# Patient Record
Sex: FEMALE | Race: WHITE
[De-identification: names, ages, dates, MRNs, and addresses within clinical notes are randomized per-mention and may not be internally consistent; named-entity substitution may affect disease eponyms.]

---

## 2018-01-24 ENCOUNTER — HOSPITAL ENCOUNTER (EMERGENCY)
Dept: HOSPITAL 62 - ER | Age: 33
Discharge: HOME | End: 2018-01-24
Payer: COMMERCIAL

## 2018-01-24 VITALS — SYSTOLIC BLOOD PRESSURE: 123 MMHG | DIASTOLIC BLOOD PRESSURE: 69 MMHG

## 2018-01-24 DIAGNOSIS — R03.0: ICD-10-CM

## 2018-01-24 DIAGNOSIS — Z79.1: ICD-10-CM

## 2018-01-24 DIAGNOSIS — Z88.0: ICD-10-CM

## 2018-01-24 DIAGNOSIS — Z79.899: ICD-10-CM

## 2018-01-24 DIAGNOSIS — M54.2: ICD-10-CM

## 2018-01-24 DIAGNOSIS — M62.830: ICD-10-CM

## 2018-01-24 DIAGNOSIS — M47.9: ICD-10-CM

## 2018-01-24 DIAGNOSIS — M43.6: Primary | ICD-10-CM

## 2018-01-24 PROCEDURE — 99283 EMERGENCY DEPT VISIT LOW MDM: CPT

## 2018-01-24 PROCEDURE — 96372 THER/PROPH/DIAG INJ SC/IM: CPT

## 2018-01-24 NOTE — ER DOCUMENT REPORT
ED Neck/Back Problem





- General


Chief Complaint: Neck Pain < 24hrs old


Stated Complaint: NECK PAIN


Time Seen by Provider: 01/24/18 16:18


Mode of Arrival: Ambulatory


Information source: Patient


TRAVEL OUTSIDE OF THE [place] IN LAST 30 DAYS: No





- HPI


Patient complains to provider of: Pain


Onset: This morning


Where: Home


Onset: Cannot confirm


Timing: Constant


Quality of pain: No pain, Cramping


Severity: Moderate


Recent injury: No


Notes: 





Patient arrives with complaints of neck pain and stiffness with stiffness to 

the right lateral neck that started this morning when she woke up.  Patient has 

a history of arthritis in her neck and states that she currently takes Celebrex 

and Robaxin for her neck.  She denies any traumas or falls.  She denies any 

fever.  She denies any headache.  She denies any unilateral numbness, tingling, 

weakness.  She denies any chest pain or shortness of breath.  She denies 

fevers.  She denies blood thinners.  She denies IV drug use.  She denies any 

immunosuppression or diabetes.  No chest pain or shortness of breath.  No bowel 

or bladder dysfunction.  She denies any abdominal pain.  No nausea, vomiting, 

diarrhea.  No blurred or loss vision.  Pain is significantly worse with any 

sort of movement, better with keeping her head still.  No rash.  No other 

complaints at this time.





- Related Data


Allergies/Adverse Reactions: 


 





Penicillins Allergy (Severe, Verified 01/24/18 13:36)


 Generalized edema











Past Medical History





- Social History


Smoking Status: Unknown if Ever Smoked


Family History: Reviewed & Not Pertinent





- Past Medical History


Cardiac Medical History: 


   Denies: Hx Heart Attack, Hx Hypertension


Pulmonary Medical History: Reports: Hx Asthma - as a child/no problem as an 

adult


Neurological Medical History: Denies: Hx Cerebrovascular Accident, Hx Seizures


GI Medical History: Denies: Hx Hepatitis, Hx Hiatal Hernia -  small umbilical 

hernia, Hx Ulcer


Infectious Medical History: Denies: Hx Hepatitis


Past Surgical History: Reports: Hx Gynecologic Surgery.  Denies: Hx Hysterectomy

, Hx Mastectomy, Hx Open Heart Surgery, Hx Pacemaker





- Immunizations


Immunizations up to date: Yes


Hx Diphtheria, Pertussis, Tetanus Vaccination: Yes





Review of Systems





- Review of Systems


-: Yes All other systems reviewed and negative





Physical Exam





- Notes


Notes: 





GENERAL: alert, cooperative, nontoxic, no distress.


HEAD: normocephalic, atraumatic


EYES: conjunctiva pink without discharge, no external redness or swelling. 

Pupils are equal, round, reactive to light.


EARS: no external swelling, no external redness


NOSE: atraumatic, no external swelling


MOUTH/THROAT: mucous membranes moist and pink, posterior pharynx without 

erythema, swelling, exudate. No trismus or drooling.


NECK: soft, supple, no meningismus.  Muscle spasm noted to the right trapezius 

muscle with tenderness along the trapezius muscle and limited range of motion 

of the neck secondary to pain and muscle spasm.  No stridor.  No redness.


CHEST: no distress, lungs clear and equal throughout.  No wheezing, rales, 

rhonchi.


CARDIAC: regular rate and rhythm, no murmur, normal capillary refill, normal 

pulses.  No peripheral edema noted.


BACK: full range of motion, no CVA tenderness.


EXTREMITIES: full range of motion of all extremities.  No redness, no swelling.


NEURO: alert and oriented x 3, cranial nerves II through XII are grossly 

intact.  Upper and lower extremities are equal throughout.  Normal sensation. 

No focal deficits, full range of motion of all extremities. normal finger to 

nose.  Bicep and tricep reflexes are +2 bilaterally.


PYSCH: appropriate mood, affect.  Patient is cooperative.


SKIN: pink, warm, dry, no rash.





Course





- Re-evaluation


Re-evalutation: 





01/24/18 16:36


Patient is nontoxic appearing with stable vitals.  The patient has a history of 

arthritis within her neck.  She woke up this morning with stiffness and 

increased pain to the neck.  She is noted to have muscle spasms to the right 

trapezius muscle and exam consistent with torticollis.  No fevers, no 

meningismus, no sign of meningitis.  She has a nonfocal neurological exam with 

normal sensation, strength and reflexes.  No sign of epidural abscess/bleed.  

Patient will be given a shot of Toradol and Valium here in the emergency 

department.  She will be discharged home with prescription for Voltaren and 

Valium.  She has an appointment with her primary care doctor tomorrow which she 

was instructed to keep for reevaluation.  She should follow-up sooner she 

develops increasing pain, fevers, weakness, any further concerns.





The patient's emergency department workup and current diagnosis were explained 

to the patient and or family.  Follow-up instructions were provided.  

Medications if prescribed were discussed. Instructions for when to return to 

the emergency department including specific  worrisome symptoms were discussed 

with the patient and/or family.





The patient is noted to have elevated blood pressure during today's emergency 

department visit.  The patient was informed of this finding.  The patient was 

instructed that this may be related to pre-hypertension and requires further 

evaluation with a primary care provider.  The patient has no hypertensive 

symptoms at this time.





Discharge





- Discharge


Clinical Impression: 


 Torticollis, acute





Condition: Stable


Disposition: HOME, SELF-CARE


Instructions:  Torticollis ([facility])


Additional Instructions: 


Take medications as prescribed.  Follow-up with your doctor as scheduled 

tomorrow.  Follow-up sooner for increasing pain, fever, severe headache, 

numbness, tingling, weakness, any further concerns.





Your blood pressure was elevated during today's visit.  Have this rechecked 

with your doctor.





The medication you were prescribed today may cause drowsiness.  Do not drive or 

operate heavy machinery while taking this medication.


Prescriptions: 


Diazepam [Valium 5 mg Tablet] 5 mg PO QIDP PRN #15 tablet


 PRN Reason: 


Diclofenac Sodium [Voltaren 50 Mg Tablet.Dr] 50 mg PO BID #20 tablet.dr


Forms:  Elevated Blood Pressure, Smoking Cessation Education


Referrals: 


SAUL LINARES MD [NO LOCAL MD] - Follow up as needed


FELISA TURNER MD [NO LOCAL MD] - Follow up as needed


MARLEY BARAKAT MD [NO LOCAL MD] - Follow up as needed


JAMISON GANDHI MD [COMMUNITY BASED STAFF] - Follow up as needed

## 2019-03-13 ENCOUNTER — HOSPITAL ENCOUNTER (EMERGENCY)
Dept: HOSPITAL 62 - ER | Age: 34
Discharge: HOME | End: 2019-03-13
Payer: COMMERCIAL

## 2019-03-13 VITALS — SYSTOLIC BLOOD PRESSURE: 135 MMHG | DIASTOLIC BLOOD PRESSURE: 74 MMHG
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
## 2019-03-13 DIAGNOSIS — S92.412A: Primary | ICD-10-CM

## 2019-03-13 DIAGNOSIS — F17.200: ICD-10-CM

## 2019-03-13 PROCEDURE — 99283 EMERGENCY DEPT VISIT LOW MDM: CPT

## 2019-03-13 NOTE — ER DOCUMENT REPORT
ED Medical Screen (RME)





- General


Chief Complaint: Toe Injury


Stated Complaint: LEFT GREAT TOE INJURY


Time Seen by Provider: 03/13/19 22:46


Mode of Arrival: Wheelchair


Information source: Patient


Notes: 





pt tripped today hurting right foot. hurts to walk, swellling noted











I have greeted and performed a rapid initial assessment of this patient.  A 

comprehensive ED assessment and evaluation of the patient, analysis of test 

results and completion of the medical decision making process will be conducted 

by additional ED providers.


TRAVEL OUTSIDE OF THE [place] IN LAST 30 DAYS: No





- Related Data


Allergies/Adverse Reactions: 


                                        





Penicillins Allergy (Severe, Verified 01/24/18 13:36)


   Generalized edema











Past Medical History





- Past Medical History


Cardiac Medical History: 


   Denies: Hx Heart Attack, Hx Hypertension


Pulmonary Medical History: Reports: Hx Asthma - as a child/no problem as an 

adult


Neurological Medical History: Denies: Hx Cerebrovascular Accident, Hx Seizures


Renal/ Medical History: Denies: Hx Peritoneal Dialysis


GI Medical History: Denies: Hx Hepatitis, Hx Hiatal Hernia -  small umbilical 

hernia, Hx Ulcer


Infectious Medical History: Denies: Hx Hepatitis


Past Surgical History: Reports: Hx Gynecologic Surgery.  Denies: Hx 

Hysterectomy, Hx Mastectomy, Hx Open Heart Surgery, Hx Pacemaker





- Immunizations


Immunizations up to date: Yes


Hx Diphtheria, Pertussis, Tetanus Vaccination: Yes





Physical Exam





- Vital signs


Vitals: 





                                        











Temp Pulse Resp BP Pulse Ox


 


 98.6 F   86   18   115/77   100 


 


 03/13/19 20:50  03/13/19 20:50  03/13/19 20:50  03/13/19 20:50  03/13/19 20:50














Course





- Vital Signs


Vital signs: 





                                        











Temp Pulse Resp BP Pulse Ox


 


 98.6 F   86   18   115/77   100 


 


 03/13/19 20:50  03/13/19 20:50  03/13/19 20:50  03/13/19 20:50  03/13/19 20:50

## 2019-03-13 NOTE — RADIOLOGY REPORT (SQ)
EXAM DESCRIPTION: 



XR FOOT 3 OR MORE VIEWS



COMPLETED DATE/TME:  03/13/2019 21:40



CLINICAL HISTORY: 



33 years, Female, toe injury



Findings: There is a minimally displaced comminuted fracture of

the first proximal phalanx extending from its base obliquely its

distal shaft. No dislocation.



IMPRESSION:



First proximal phalanx minimally displaced fracture.

## 2019-03-13 NOTE — ER DOCUMENT REPORT
HPI





- HPI


Time Seen by Provider: 03/13/19 22:46


Pain Level: 5


Notes: 





Patient is a 33-year-old female with no significant past medical history who 

presents to the emergency department complaining of left great toe pain status 

post injury yesterday.  Patient states that she tripped somehow and is not sure 

which way her toe went, but noted immediate pain.  Patient states that she has 

been limping around since then.  Patient states that the pain is worse with 

touching and movement.  Denies drug allergies aside from penicillin.  No other 

concerns or complaints.  Denies IV drug abuse.  Denies any headache, fever, URI,

sore throat, chest pain, palpitations, syncope, cough, shortness of breath, 

wheeze, dyspnea, abdominal pain, nausea/vomiting/diarrhea, urinary retention, 

dysuria, hematuria, loss of control of bowel or bladder, muscle 

paralysis/weakness, or rash.





- ROS


Systems Reviewed and Negative: Yes All other systems reviewed and negative





- REPRODUCTIVE


Reproductive: DENIES: Pregnant:





Past Medical History





- General


Information source: Patient





- Social History


Smoking Status: Current Every Day Smoker


Family History: Reviewed & Not Pertinent





- Past Medical History


Cardiac Medical History: 


   Denies: Hx Heart Attack, Hx Hypertension


Pulmonary Medical History: Reports: Hx Asthma - as a child/no problem as an 

adult


Neurological Medical History: Denies: Hx Cerebrovascular Accident, Hx Seizures


Renal/ Medical History: Denies: Hx Peritoneal Dialysis


GI Medical History: Denies: Hx Hepatitis, Hx Hiatal Hernia -  small umbilical 

hernia, Hx Ulcer


Infectious Medical History: Denies: Hx Hepatitis


Past Surgical History: Reports: Hx Gynecologic Surgery.  Denies: Hx 

Hysterectomy, Hx Mastectomy, Hx Open Heart Surgery, Hx Pacemaker





- Immunizations


Immunizations up to date: Yes


Hx Diphtheria, Pertussis, Tetanus Vaccination: Yes





Vertical Provider Document





- CONSTITUTIONAL


Agree With Documented VS: Yes


Notes: 





PHYSICAL EXAMINATION:





GENERAL: Well-appearing, well-nourished and in no acute distress.





LUNGS: Breath sounds clear to auscultation bilaterally and equal.  No wheezes 

rales or rhonchi.





HEART: Regular rate and rhythm without murmurs, rubs, gallops.





Musculoskeletal: Left foot/ankle:  + ecchymosis and swelling to the left great 

toe.  + associated tenderness.  LROM to passive/active of the toe, FROM 

otherwise. Strength 5+/5. N/V intact distal.  Achilles intact.





Extremities:  No cyanosis, clubbing, or edema b/l.  Peripheral pulses 2+.  

Capillary refill less than 3 seconds.





NEUROLOGICAL: Normal speech, limping gait.  Normal sensory, motor exams 





PSYCH: Normal mood, normal affect.





SKIN: Warm, Dry, normal turgor, no rashes or lesions noted.





- INFECTION CONTROL


TRAVEL OUTSIDE OF THE [place] IN LAST 30 DAYS: No





Course





- Re-evaluation


Re-evalutation: 





03/13/19 


Patient is an afebrile, well-hydrated, 33-year-old female who presents to the ED

with a fracture to the left prox great toe, mild displacement, occurred 24hrs 

ago.  Vitals are acceptable without any significant tachycardia, tachypnea, or 

hypoxia.  PE is otherwise unremarkable for any neurovascular compromise, obvious

tendon/ligament rupture, open fracture, septic joint.  See XR result.  Splint 

applied today and crutches provided. Tylenol given PO.  Patient is nontoxic-

appearing.  No other labs or imaging warranted at this time based on H&P.  

Conservative measures otherwise for symptoms.  Recheck with your PCM in 3-5 d

ays.  Call orthopedics tomorrow to schedule an appointment for further 

evaluation and management.  Return to the ED with any worsening/concerning 

symptoms otherwise as reviewed in discharge.  Patient is in agreement.





- Vital Signs


Vital signs: 


                                        











Temp Pulse Resp BP Pulse Ox


 


 98.6 F   86   18   115/77   100 


 


 03/13/19 20:50  03/13/19 20:50  03/13/19 20:50  03/13/19 20:50  03/13/19 20:50














Procedures





- Immobilization


  ** Left Foot


Time completed: 23:20


Pre-Proc Neuro Vasc Exam: Normal


Immobilizer type: Posterior ankle


Performed by: PCT


Post-Proc Neuro Vasc Exam: Normal, Unchanged from pre-exam





Discharge





- Discharge


Clinical Impression: 


Toe fracture, left


Qualifiers:


 Encounter type: initial encounter Toe: great toe Fracture type: closed Phalanx:

proximal Fracture alignment: displaced Qualified Code(s): S92.412A - Displaced 

fracture of proximal phalanx of left great toe, initial encounter for closed 

fracture





Condition: Stable


Disposition: HOME, SELF-CARE


Instructions:  Splint Precautions ([facility])


Additional Instructions: 


Rest, Ice, Compression, Elevation


Use crutches/splint as directed


Tylenol/ibuprofen as needed


F/u with your PCP in 3-5 days for a recheck


Call orthopedics tomorrow to schedule an appointment for further evaluation and 

management





Return to the ED with any worsening symptoms and/or development of fever, 

headache, chest pain, palpitations, syncope, shortness of breath, trouble 

breathing, abdominal pain, n/v/d, muscle weakness/paralysis, numbness/tingling, 

swelling, redness, or other worsening symptoms that are concerning to you.


Forms:  Smoking Cessation Education, Return to Work


Referrals: 


[facility]) [Provider Group] - Follow up in 3-5 days

## 2020-05-29 ENCOUNTER — HOSPITAL ENCOUNTER (EMERGENCY)
Dept: HOSPITAL 62 - ER | Age: 35
Discharge: HOME | End: 2020-05-29
Payer: COMMERCIAL

## 2020-05-29 VITALS — DIASTOLIC BLOOD PRESSURE: 78 MMHG | SYSTOLIC BLOOD PRESSURE: 130 MMHG
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
## 2020-05-29 DIAGNOSIS — Z88.0: ICD-10-CM

## 2020-05-29 DIAGNOSIS — S96.912A: Primary | ICD-10-CM

## 2020-05-29 DIAGNOSIS — F17.210: ICD-10-CM

## 2020-05-29 DIAGNOSIS — J45.909: ICD-10-CM
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
## 2020-05-29 PROCEDURE — 99283 EMERGENCY DEPT VISIT LOW MDM: CPT

## 2020-05-29 NOTE — ER DOCUMENT REPORT
ED Extremity Problem, Lower





- General


Chief Complaint: Toe Injury


Stated Complaint: TOE PAIN


Time Seen by Provider: 05/29/20 18:40


Primary Care Provider: 


MARIA LUZ SAUER PA-C [Primary Care Provider] - Follow up as needed


ЕЛЕНА IRVIN DO [ACTIVE STAFF] - Follow up as needed


Mode of Arrival: Wheelchair


Information source: Patient


Notes: 





Patient is a 34-year-old female comes emergency room complaining of left great 

toe pain.  Patient states she was coming down her steps and she slipped and 

hyperextended her left great toe.  Patient states that toe was fractured 1 year 

ago.  Injury occurred at home.  She denies any other injuries.  Patient was 

barefoot at the time of the incident.


TRAVEL OUTSIDE OF THE [place] IN LAST 30 DAYS: No





- HPI


Patient complains to provider of: Injury.  No: Swelling


Location: Great Toe


Occurred: Just prior to arrival


Where: Home


Onset/Duration: Sudden


Quality of pain: Throbbing


Severity: Moderate


Pain Level: 3


Context: Barefoot


Recent injury: Yes


Exacerbated by: Movement, Walking


Relieved by: Elevation, Rest





- Related Data


Allergies/Adverse Reactions: 


                                        





Penicillins Allergy (Severe, Verified 05/29/20 18:39)


   Generalized edema








Home Medications: zyrtec.  diclofenac





Past Medical History





- General


Information source: Patient





- Social History


Smoking Status: Current Every Day Smoker


Cigarette use (# per day): Yes - Half pack a day


Chew tobacco use (# tins/day): No


Smoking Education Provided: Yes


Frequency of alcohol use: Social


Drug Abuse: None


Family History: Reviewed & Not Pertinent


Patient has homicidal ideation: No





- Past Medical History


Cardiac Medical History: 


   Denies: Hx Heart Attack, Hx Hypertension


Pulmonary Medical History: Reports: Hx Asthma - as a child/no problem as an 

adult


Neurological Medical History: Denies: Hx Cerebrovascular Accident, Hx Seizures


Renal/ Medical History: Denies: Hx Peritoneal Dialysis


GI Medical History: Denies: Hx Hepatitis, Hx Hiatal Hernia -  small umbilical 

hernia, Hx Ulcer


Infectious Medical History: Denies: Hx Hepatitis


Past Surgical History: Reports: Hx Gynecologic Surgery.  Denies: Hx 

Hysterectomy, Hx Mastectomy, Hx Open Heart Surgery, Hx Pacemaker





- Immunizations


Immunizations up to date: Yes


Hx Diphtheria, Pertussis, Tetanus Vaccination: Yes





Review of Systems





- Review of Systems


Constitutional: No symptoms reported


EENT: No symptoms reported


Cardiovascular: No symptoms reported


Respiratory: No symptoms reported


Gastrointestinal: No symptoms reported


Genitourinary: No symptoms reported


Female Genitourinary: No symptoms reported


Musculoskeletal: See HPI, Joint pain


Skin: No symptoms reported


Hematologic/Lymphatic: No symptoms reported


Neurological/Psychological: No symptoms reported


-: Yes All other systems reviewed and negative





Physical Exam





- Vital signs


Vitals: 


                                        











Temp Pulse Resp BP Pulse Ox


 


 99.5 F   86   20   138/71 H  100 


 


 05/29/20 18:31  05/29/20 18:31  05/29/20 18:31  05/29/20 18:31  05/29/20 18:31











Interpretation: Hypertensive





- Notes


Notes: 





PHYSICAL EXAMINATION:





GENERAL: Well-appearing, well-nourished and in no acute distress.





HEAD: Atraumatic, normocephalic.





LUNGS: Breath sounds clear to auscultation bilaterally and equal.  No wheezes 

rales or rhonchi.





HEART: Regular rate and rhythm without murmurs





Musculoskeletal: Examination patient's area concern is her left great toe.  

Examination shows no overt swelling noted.  Mild tenderness is noted at the base

of the great toe at the distal metatarsal area.  Patient has some increased 

discomfort with resistance against flexion but not as bad on extension.  She has

good cap refill in nailbeds of the toe itself.  Palpation of the dorsum of the 

foot shows no tenderness.  Palpation of the ankle shows no tenderness and has 

full range of motion with flexion extension that is good against resistance as 

well as rotation.





NEUROLOGICAL: Normal speech, normal gait.  Normal sensory, motor exams





PSYCH: Normal mood, normal affect.





SKIN: Warm, Dry, normal turgor, no rashes or lesions noted.





Course





- Re-evaluation


Re-evalutation: 





05/30/20 00:13


Patient's x-ray was negative for any fractures.  Examination was non-concerning 

for a ligament damage since she had flexion and extension but had discomfort 

with 3 resistance in flexion.  Given that we placed patient in a postop shoe and

crutches nonweightbearing for the next couple of days.  Tylenol Motrin for pain 

or discomfort and ice 2-3 times a day for a few minutes.





- Vital Signs


Vital signs: 


                                        











Temp Pulse Resp BP Pulse Ox


 


 98.8 F   80   16   130/78 H  98 


 


 05/29/20 21:35  05/29/20 21:35  05/29/20 21:35  05/29/20 21:35  05/29/20 21:35














Procedures





- Immobilization


  ** Left Foot Great toe


Time completed: 21:09


Pre-Proc Neuro Vasc Exam: Normal


Immobilizer type: Post-op shoe


Performed by: RN


Post-Proc Neuro Vasc Exam: Normal


Alignment checked and good: No





Discharge





- Discharge


Clinical Impression: 


Strain of toe of left foot


Qualifiers:


 Encounter type: initial encounter Qualified Code(s): S96.912A - Strain of 

unspecified muscle and tendon at ankle and foot level, left foot, initial 

encounter





Disposition: HOME, SELF-CARE


Instructions:  Sprained Toe ([facility])


Additional Instructions: 


Use the crutches and postop shoe for the next 2 to 3 days.  Try to be 

nonweightbearing as much as possible or time.  You should start noticing a 

change in the discomfort over the next 24 to 48 hours.  Ice to the toe or foot 3

times a day.  Take Tylenol and Motrin for pain or discomfort.  If pain continues

I will give you the name the orthopedic doctor he can follow-up with.  Should 

you have any other concerns or problems return to ER for recheck.


Forms:  Parent Work Note


Referrals: 


MARIA LUZ SAUER PA-C [Primary Care Provider] - Follow up as needed


ЕЛЕНА IRVIN DO [ACTIVE STAFF] - Follow up as needed

## 2020-05-29 NOTE — RADIOLOGY REPORT (SQ)
EXAM DESCRIPTION:  FOOT LEFT COMPLETE



IMAGES COMPLETED DATE/TIME:  5/29/2020 7:20 pm



REASON FOR STUDY:  fall



COMPARISON:  None.



NUMBER OF VIEWS:  Three views.



TECHNIQUE:  AP, lateral and oblique  radiographic images acquired of the left foot.



LIMITATIONS:  None.



FINDINGS:  MINERALIZATION: Normal.

BONES: No acute fracture or dislocation.  No worrisome bone lesions.

JOINTS: No effusions.

SOFT TISSUES: No soft tissue swelling.  No foreign body.

OTHER: No other significant finding.



IMPRESSION:  NEGATIVE STUDY OF THE LEFT FOOT. NO RADIOGRAPHIC EVIDENCE OF ACUTE INJURY.



TECHNICAL DOCUMENTATION:  JOB ID:  [id number]

 2011 [employer]- All Rights Reserved



Reading location - IP/workstation name: [email or web address]

## 2020-07-06 ENCOUNTER — HOSPITAL ENCOUNTER (EMERGENCY)
Dept: HOSPITAL 62 - ER | Age: 35
Discharge: HOME | End: 2020-07-06
Payer: COMMERCIAL

## 2020-07-06 VITALS — DIASTOLIC BLOOD PRESSURE: 89 MMHG | SYSTOLIC BLOOD PRESSURE: 142 MMHG

## 2020-07-06 DIAGNOSIS — R51: ICD-10-CM

## 2020-07-06 DIAGNOSIS — L55.0: Primary | ICD-10-CM

## 2020-07-06 PROCEDURE — 99282 EMERGENCY DEPT VISIT SF MDM: CPT

## 2020-07-06 NOTE — ER DOCUMENT REPORT
HPI





- HPI


Time Seen by Provider: 07/06/20 21:50


Notes: 





Otherwise healthy 34-year-old female presents emergency department concern for 

sunburn.  Patient reports she was in the sun 3 days ago, she states she received

a severe sunburn and also has a headache.  She reports she did use SPF 50 and a

pplied it multiple times.








- ROS


Systems Reviewed and Negative: Yes All other systems reviewed and negative





- CONSTITUTIONAL


Constitutional: REPORTS: Chills





- NEURO


Neurology: REPORTS: Headache





- REPRODUCTIVE


Reproductive: DENIES: Pregnant:





- DERM


Skin Color: Other - Sunburn





Past Medical History





- General


Information source: Patient





- Social History


Smoking Status: Never Smoker


Frequency of alcohol use: None


Drug Abuse: None


Family History: Reviewed & Not Pertinent





- Past Medical History


Cardiac Medical History: 


   Denies: Hx Heart Attack, Hx Hypertension


Pulmonary Medical History: Reports: Hx Asthma - as a child/no problem as an 

adult


Neurological Medical History: Denies: Hx Cerebrovascular Accident, Hx Seizures


Renal/ Medical History: Denies: Hx Peritoneal Dialysis


GI Medical History: Denies: Hx Hepatitis, Hx Hiatal Hernia -  small umbilical 

hernia, Hx Ulcer


Infectious Medical History: Denies: Hx Hepatitis


Past Surgical History: Reports: Hx Gynecologic Surgery.  Denies: Hx 

Hysterectomy, Hx Mastectomy, Hx Open Heart Surgery, Hx Pacemaker





- Immunizations


Immunizations up to date: Yes


Hx Diphtheria, Pertussis, Tetanus Vaccination: Yes





Vertical Provider Document





- CONSTITUTIONAL


Notes: 





PHYSICAL EXAMINATION:





GENERAL: Well-appearing, well-nourished and in no acute distress.





HEAD: Atraumatic, normocephalic.





EYES: Pupils equal round extraocular movements intact,  conjunctiva are normal.





ENT: Nares patent





NECK: Normal range of motion





LUNGS: No respiratory distress





Musculoskeletal: Normal range of motion





NEUROLOGICAL:  Normal speech, normal gait. 





PSYCH: Normal mood, normal affect.





SKIN: First-degree burns noted to patient's chest, back and face.  Consistent 

with sunburn.





- INFECTION CONTROL


TRAVEL OUTSIDE OF THE [place] IN LAST 30 DAYS: No





Course





- Re-evaluation


Re-evalutation: 





Patient given a dose of Fioricet here in the emergency department for headache. 

She will be instructed to start taking Aquaphor for her sunburn.  Patient 

verbalized understanding and agreement with this plan.





- Vital Signs


Vital signs: 


                                        











Temp Pulse Resp BP Pulse Ox


 


 99.2 F   90   20   142/89 H  100 


 


 07/06/20 21:30  07/06/20 21:30  07/06/20 21:30  07/06/20 21:30  07/06/20 21:30














Discharge





- Discharge


Clinical Impression: 


 Sunburn





Headache


Qualifiers:


 Headache type: unspecified Headache chronicity pattern: unspecified pattern 

Intractability: not intractable Qualified Code(s): R51 - Headache





Condition: Stable


Disposition: HOME, SELF-CARE


Additional Instructions: 


Sunburn





     Sunburn is caused by prolonged exposure to ultraviolet light. This can be 

natural sunlight or a tanning bed.  Your symptoms may include redness or 

blistering of the skin, fatigue, weakness, and chills that last two or three 

days.


     Treatment includes antiinflammatory pain medication, rest, cooling baths, 

and moisturizing skin cream.  Occasionally, cortisone-type medicine is required 

for severe sunburns. Antihistamines may be helpful if itching is severe as you 

heal.


     You should avoid any exposure to ultraviolet light for the next week or two

so that further skin damage can be avoided.  In the future, you should use 

sunscreens.  Frequent or prolonged ultraviolet light exposure can cause 

premature skin aging, skin cancers, and wrinkles.


     Call the doctor if you are not improving in two or three days. Report any 

drainage, increasing swelling, fever, chills, or other signs of infection.





****Please purchase over-the-counter Aquaphor cream, apply this to the areas 3 

times per day.  Take the steroids as prescribed.  Follow-up with your primary 

care doctor.****





Prescriptions: 


Prednisone [Deltasone 20 mg Tablet] 3 tab PO DAILY 4 Days #12 tablet


Forms:  Return to Work


Referrals: 


MARIA LUZ SAUER PA-C [Primary Care Provider] - Follow up as needed

## 2020-07-28 ENCOUNTER — HOSPITAL ENCOUNTER (EMERGENCY)
Dept: HOSPITAL 62 - ER | Age: 35
LOS: 1 days | Discharge: HOME | End: 2020-07-29
Payer: COMMERCIAL

## 2020-07-28 DIAGNOSIS — R10.12: ICD-10-CM

## 2020-07-28 DIAGNOSIS — R51: ICD-10-CM

## 2020-07-28 DIAGNOSIS — Z97.5: ICD-10-CM

## 2020-07-28 DIAGNOSIS — R10.814: ICD-10-CM

## 2020-07-28 DIAGNOSIS — F17.200: ICD-10-CM

## 2020-07-28 DIAGNOSIS — R10.812: ICD-10-CM

## 2020-07-28 DIAGNOSIS — Z88.0: ICD-10-CM

## 2020-07-28 DIAGNOSIS — K57.32: Primary | ICD-10-CM

## 2020-07-28 LAB
ADD MANUAL DIFF: NO
ALBUMIN SERPL-MCNC: 3.9 G/DL (ref 3.5–5)
ALP SERPL-CCNC: 62 U/L (ref 38–126)
ANION GAP SERPL CALC-SCNC: 4 MMOL/L (ref 5–19)
APPEARANCE UR: (no result)
APTT PPP: YELLOW S
AST SERPL-CCNC: 21 U/L (ref 14–36)
BASOPHILS # BLD AUTO: 0.1 10^3/UL (ref 0–0.2)
BASOPHILS NFR BLD AUTO: 1.3 % (ref 0–2)
BILIRUB DIRECT SERPL-MCNC: 0 MG/DL (ref 0–0.4)
BILIRUB SERPL-MCNC: 0.4 MG/DL (ref 0.2–1.3)
BILIRUB UR QL STRIP: NEGATIVE
BUN SERPL-MCNC: 12 MG/DL (ref 7–20)
CALCIUM: 8.9 MG/DL (ref 8.4–10.2)
CHLORIDE SERPL-SCNC: 103 MMOL/L (ref 98–107)
CO2 SERPL-SCNC: 29 MMOL/L (ref 22–30)
EOSINOPHIL # BLD AUTO: 0.2 10^3/UL (ref 0–0.6)
EOSINOPHIL NFR BLD AUTO: 1.8 % (ref 0–6)
ERYTHROCYTE [DISTWIDTH] IN BLOOD BY AUTOMATED COUNT: 12.8 % (ref 11.5–14)
GLUCOSE SERPL-MCNC: 86 MG/DL (ref 75–110)
GLUCOSE UR STRIP-MCNC: NEGATIVE MG/DL
HCT VFR BLD CALC: 38.3 % (ref 36–47)
HGB BLD-MCNC: 13.2 G/DL (ref 12–15.5)
KETONES UR STRIP-MCNC: NEGATIVE MG/DL
LYMPHOCYTES # BLD AUTO: 2.3 10^3/UL (ref 0.5–4.7)
LYMPHOCYTES NFR BLD AUTO: 20.9 % (ref 13–45)
MCH RBC QN AUTO: 32.4 PG (ref 27–33.4)
MCHC RBC AUTO-ENTMCNC: 34.6 G/DL (ref 32–36)
MCV RBC AUTO: 94 FL (ref 80–97)
MONOCYTES # BLD AUTO: 0.5 10^3/UL (ref 0.1–1.4)
MONOCYTES NFR BLD AUTO: 5 % (ref 3–13)
NEUTROPHILS # BLD AUTO: 7.6 10^3/UL (ref 1.7–8.2)
NEUTS SEG NFR BLD AUTO: 71 % (ref 42–78)
NITRITE UR QL STRIP: NEGATIVE
PH UR STRIP: 6 [PH] (ref 5–9)
PLATELET # BLD: 303 10^3/UL (ref 150–450)
POTASSIUM SERPL-SCNC: 4.4 MMOL/L (ref 3.6–5)
PROT SERPL-MCNC: 6.4 G/DL (ref 6.3–8.2)
PROT UR STRIP-MCNC: 30 MG/DL
RBC # BLD AUTO: 4.08 10^6/UL (ref 3.72–5.28)
SP GR UR STRIP: 1.02
TOTAL CELLS COUNTED % (AUTO): 100 %
UROBILINOGEN UR-MCNC: 2 MG/DL (ref ?–2)
WBC # BLD AUTO: 10.8 10^3/UL (ref 4–10.5)

## 2020-07-28 PROCEDURE — 96374 THER/PROPH/DIAG INJ IV PUSH: CPT

## 2020-07-28 PROCEDURE — 74177 CT ABD & PELVIS W/CONTRAST: CPT

## 2020-07-28 PROCEDURE — 36415 COLL VENOUS BLD VENIPUNCTURE: CPT

## 2020-07-28 PROCEDURE — 96361 HYDRATE IV INFUSION ADD-ON: CPT

## 2020-07-28 PROCEDURE — 85025 COMPLETE CBC W/AUTO DIFF WBC: CPT

## 2020-07-28 PROCEDURE — 81001 URINALYSIS AUTO W/SCOPE: CPT

## 2020-07-28 PROCEDURE — 80053 COMPREHEN METABOLIC PANEL: CPT

## 2020-07-28 PROCEDURE — 99284 EMERGENCY DEPT VISIT MOD MDM: CPT

## 2020-07-28 PROCEDURE — 81025 URINE PREGNANCY TEST: CPT

## 2020-07-28 NOTE — ER DOCUMENT REPORT
ED Medical Screen (RME)





- General


Chief Complaint: Headache


Stated Complaint: HEADACHE,ABDOMINAL PAIN


Time Seen by Provider: 07/28/20 19:33


Primary Care Provider: 


MARIA LUZ SAUER PA-C [Primary Care Provider] - Follow up as needed


TRAVEL OUTSIDE OF THE [place] IN LAST 30 DAYS: No





- HPI


Notes: 





07/28/20 19:56


34-year-old female presents to the emergency room with complaints of a headache 

x2 days with left upper quadrant abdominal pain that started this morning.  

Reports abdominal pain is worse with movement better when laying down.  Reports 

pain to the left upper quadrant radiates to epigastric area.  Denies any nausea 

vomiting or diarrhea, denies any fever chills, denies any chest pain or 

shortness of breath.  Last bowel movement was today.  Patient states she does 

have her E sure, states her last menstrual cycle was July 3, did have some 

vaginal spotting today.  G3, P3, denies any abdominal surgeries.  Patient states

she drinks in moderation.  Denies any sore throat, congestion.  Denies worst 

headache of life.  Denies not have a history of headaches or migraines,. 





I have greeted and performed a rapid initial assessment of this patient.  A 

comprehensive ED assessment and evaluation of the patient, analysis of test 

results and completion of the medical decision making process will be conducted 

by additional ED providers.





PHYSICAL EXAMINATION:





GENERAL: Well-appearing, well-nourished and in no acute distress.





HEAD: Atraumatic, normocephalic.





EYES: Pupils equal round extraocular movements intact,  conjunctiva are normal.





NECK: Normal range of motion





CV: s1, s2 regular 





LUNGS: No respiratory distress





abd: LUQ abd pain to palpation, no cva tenderness appreciated bilaterally 





Musculoskeletal: Normal range of motion





NEUROLOGICAL:  Normal speech, normal gait. 





SKIN: Warm, Dry, normal turgor, no rashes or lesions noted.





- Related Data


Allergies/Adverse Reactions: 


                                        





Penicillins Allergy (Severe, Verified 05/29/20 18:39)


   Generalized edema











Past Medical History





- Past Medical History


Cardiac Medical History: 


   Denies: Hx Heart Attack, Hx Hypertension


Pulmonary Medical History: Reports: Hx Asthma - as a child/no problem as an 

adult


Neurological Medical History: Denies: Hx Cerebrovascular Accident, Hx Seizures


Renal/ Medical History: Denies: Hx Peritoneal Dialysis


GI Medical History: Denies: Hx Hepatitis, Hx Hiatal Hernia -  small umbilical 

hernia, Hx Ulcer


Infectious Medical History: Denies: Hx Hepatitis


Past Surgical History: Reports: Hx Gynecologic Surgery.  Denies: Hx Hysterecto

my, Hx Mastectomy, Hx Open Heart Surgery, Hx Pacemaker





- Immunizations


Immunizations up to date: Yes


Hx Diphtheria, Pertussis, Tetanus Vaccination: Yes





Doctor's Discharge





- Discharge


Referrals: 


MARIA LUZ SAUER PA-C [Primary Care Provider] - Follow up as needed

## 2020-07-29 VITALS — DIASTOLIC BLOOD PRESSURE: 65 MMHG | SYSTOLIC BLOOD PRESSURE: 120 MMHG

## 2020-07-29 NOTE — ER DOCUMENT REPORT
ED GI/





- General


Chief Complaint: Abdominal Pain


Stated Complaint: HEADACHE,ABDOMINAL PAIN


Time Seen by Provider: 07/28/20 19:33


Primary Care Provider: 


MARIA LUZ SAUER PA-C [Primary Care Provider] - Follow up as needed


Notes: 





LIT HPI:


34-year-old female presents to the emergency room with complaints of a headache 

x2 days with left upper quadrant abdominal pain that started this morning.  

Reports abdominal pain is worse with movement better when laying down.  Reports 

pain to the left upper quadrant radiates to epigastric area.  Denies any nausea 

vomiting or diarrhea, denies any fever chills, denies any chest pain or 

shortness of breath.  Last bowel movement was today.  Patient states she does 

have her E sure, states her last menstrual cycle was July 3, did have some 

vaginal spotting today.  G3, P3, denies any abdominal surgeries.  Patient states

she drinks in moderation.  Denies any sore throat, congestion.  Denies worst 

headache of life.  Denies not have a history of headaches or migraines,. 





TRAVEL OUTSIDE OF THE [place] IN LAST 30 DAYS: No





- Related Data


Allergies/Adverse Reactions: 


                                        





Penicillins Allergy (Severe, Verified 05/29/20 18:39)


   Generalized edema








Home Medications: zyrtec





Past Medical History





- General


Information source: Patient





- Social History


Smoking Status: Current Every Day Smoker


Chew tobacco use (# tins/day): No


Frequency of alcohol use: Social


Drug Abuse: None


Family History: Reviewed & Not Pertinent





- Past Medical History


Cardiac Medical History: 


   Denies: Hx Heart Attack, Hx Hypertension


Pulmonary Medical History: Reports: Hx Asthma - as a child/no problem as an 

adult


Neurological Medical History: Denies: Hx Cerebrovascular Accident, Hx Seizures


Renal/ Medical History: Denies: Hx Peritoneal Dialysis


GI Medical History: Denies: Hx Hepatitis, Hx Hiatal Hernia -  small umbilical 

hernia, Hx Ulcer


Infectious Medical History: Denies: Hx Hepatitis


Past Surgical History: Reports: Hx Gynecologic Surgery.  Denies: Hx 

Hysterectomy, Hx Mastectomy, Hx Open Heart Surgery, Hx Pacemaker





- Immunizations


Immunizations up to date: Yes


Hx Diphtheria, Pertussis, Tetanus Vaccination: Yes





Review of Systems





- Review of Systems


Constitutional: No symptoms reported


EENT: No symptoms reported


Cardiovascular: No symptoms reported


Respiratory: No symptoms reported


Gastrointestinal: Abdominal pain


Genitourinary: No symptoms reported


Female Genitourinary: No symptoms reported


Musculoskeletal: No symptoms reported


Skin: No symptoms reported


Hematologic/Lymphatic: No symptoms reported


Neurological/Psychological: Headaches





Physical Exam





- Vital signs


Vitals: 


                                        











Temp Pulse Resp BP Pulse Ox


 


 99.0 F   67   20   121/69   100 


 


 07/28/20 21:33  07/28/20 21:33  07/28/20 21:33  07/28/20 21:33  07/28/20 21:33














- Notes


Notes: 





PHYSICAL EXAMINATION:





GENERAL: Well-appearing, well-nourished and in no acute distress.





HEAD: Atraumatic, normocephalic.





EYES: Pupils equal round and reactive to light, extraocular movements intact, 

conjunctiva are normal.





ENT: Nares patent, oropharynx clear without exudates.  Moist mucous membranes.





NECK: Normal range of motion, supple without lymphadenopathy





LUNGS: Breath sounds clear to auscultation bilaterally and equal.  No wheezes 

rales or rhonchi.





HEART: Regular rate and rhythm without murmurs





ABDOMEN: Soft, nondistended abdomen.  Tenderness to palpation to the left upper 

and left lower quadrant.  No guarding, no rebound.  No masses appreciated.





Female : deferred





Musculoskeletal: Normal range of motion, no pitting or edema.  No cyanosis.





NEUROLOGICAL: Cranial nerves grossly intact.  Normal speech, normal gait.  

Normal sensory, motor exams





PSYCH: Normal mood, normal affect.





SKIN: Warm, Dry, normal turgor, no rashes or lesions noted.





Course





- Re-evaluation


Re-evalutation: 








Laboratory











  07/28/20 07/28/20 07/28/20





  22:20 22:20 22:20


 


WBC  10.8 H  


 


RBC  4.08  


 


Hgb  13.2  


 


Hct  38.3  


 


MCV  94  


 


MCH  32.4  


 


MCHC  34.6  


 


RDW  12.8  


 


Plt Count  303  


 


Lymph % (Auto)  20.9  


 


Mono % (Auto)  5.0  


 


Eos % (Auto)  1.8  


 


Baso % (Auto)  1.3  


 


Absolute Neuts (auto)  7.6  


 


Absolute Lymphs (auto)  2.3  


 


Absolute Monos (auto)  0.5  


 


Absolute Eos (auto)  0.2  


 


Absolute Basos (auto)  0.1  


 


Seg Neutrophils %  71.0  


 


Sodium   135.6 L 


 


Potassium   4.4 


 


Chloride   103 


 


Carbon Dioxide   29 


 


Anion Gap   4 L 


 


BUN   12 


 


Creatinine   0.57 


 


Est GFR ( Amer)   > 60 


 


Est GFR (MDRD) Non-Af   > 60 


 


Glucose   86 


 


Calcium   8.9 


 


Total Bilirubin   0.4 


 


Direct Bilirubin   0.0 


 


Neonat Total Bilirubin   Not Reportable 


 


Neonat Direct Bilirubin   Not Reportable 


 


Neonat Indirect Bili   Not Reportable 


 


AST   21 


 


ALT   19 


 


Alkaline Phosphatase   62 


 


Total Protein   6.4 


 


Albumin   3.9 


 


Urine Color    YELLOW


 


Urine Appearance    SLIGHTLY-CLOUDY


 


Urine pH    6.0


 


Ur Specific Gravity    1.024


 


Urine Protein    30 H


 


Urine Glucose (UA)    NEGATIVE


 


Urine Ketones    NEGATIVE


 


Urine Blood    LARGE H


 


Urine Nitrite    NEGATIVE


 


Urine Bilirubin    NEGATIVE


 


Urine Urobilinogen    2.0 H


 


Ur Leukocyte Esterase    SMALL H


 


Urine WBC (Auto)    10


 


Urine RBC (Auto)    >182


 


Urine Bacteria (Auto)    TRACE


 


Squamous Epi Cells Auto    4


 


Amorphous Sediment Auto    TRACE


 


Urine Mucus (Auto)    FEW


 


Urine Ascorbic Acid    NEGATIVE


 


Urine HCG, Qual    NEGATIVE











                                        





Abdomen/Pelvis CT  07/28/20 19:38


IMPRESSION:


Mild acute uncomplicated diverticulitis of the left hemicolon.


 


 


 








Patient appears well, nontoxic.  Her CT shows uncomplicated diverticulitis.  

Patient has no history of this.  Patient is not actively vomiting.  She is an 

appropriate candidate to be discharged home on oral medications.  She will be 

sent home on Cipro and Flagyl.  ED return precautions discussed, patient 

verbalizes understanding and agreement with same.








- Vital Signs


Vital signs: 


                                        











Temp Pulse Resp BP Pulse Ox


 


 97.5 F   63   18   120/65   98 


 


 07/29/20 01:31  07/29/20 01:31  07/29/20 01:31  07/29/20 01:31  07/29/20 01:31














- Laboratory


Result Diagrams: 


                                 07/28/20 22:20





                                 07/28/20 22:20


Laboratory results interpreted by me: 


                                        











  07/28/20 07/28/20 07/28/20





  22:20 22:20 22:20


 


WBC  10.8 H  


 


Sodium   135.6 L 


 


Anion Gap   4 L 


 


Urine Protein    30 H


 


Urine Blood    LARGE H


 


Urine Urobilinogen    2.0 H


 


Ur Leukocyte Esterase    SMALL H














Discharge





- Discharge


Clinical Impression: 


 Diverticulitis





Condition: Stable


Disposition: HOME, SELF-CARE


Additional Instructions: 


Diverticulitis





     You have been diagnosed as having diverticulitis.  This is an inflammation 

of a small pouch attached to the colon, called a diverticulum.  Many of these 

small pouches can form on the colon as you get older.  They are often caused by 

constipation.  When inflamed or infected, symptoms arise -- usually abdominal 

pain, constipation or diarrhea, fever, and blood in the stool.


     Severe diverticulitis may require hospitalization.  More mild cases are 

usually treated with antibiotics and clear liquid diet.  As you improve, a diet 

low in residue (one which forms little stool) is prescribed.


     When you are better, you should eat a high-fiber diet.  Stool softeners 

(like Metamucil) are usually recommended.


     Call the doctor or go to the hospital if there is increasing pain, 

vomiting, high fever, large amounts of blood passed, or if bowel movements 

cease.





Prescriptions: 


Ciprofloxacin HCl [Cipro 500 mg Tablet] 500 mg PO BID #20 tablet


Metronidazole [Flagyl 500 mg Tablet] 500 mg PO Q8H #30 tablet


Promethazine HCl [Phenergan 25 mg Tablet] 1 - 2 tab PO Q6H PRN #20 tablet


 PRN Reason: 


Forms:  Return to Work


Referrals: 


MARIA LUZ SAUER PA-C [Primary Care Provider] - Follow up as needed

## 2020-07-29 NOTE — RADIOLOGY REPORT (SQ)
CLINICAL INDICATION: LUQ and epigastric abd pain. .



TECHNIQUE: Contrast  enhanced spiral axial CT imaging was

obtained of the abdomen and pelvis with multiplanar

reconstructions.  This exam was performed according to our

departmental dose-optimization program, which includes automated

exposure control, adjustment of the mA and/or kV according to

patient size and/or use of iterative reconstruction techniques.



COMPARISON: None.



CORRELATION: None.



FINDINGS: 

Abdomen:

The lung bases are grossly clear.  The heart is of normal size. 

No evidence of pleural or pericardial fluid.  



The liver is of normal size contour and attenuation.  The

gallbladder is nondistended without inflammatory change.  The

pancreas is unremarkable.  The spleen is unremarkable.  The

adrenals are unremarkable.  The kidneys appear grossly normal

without evidence of urolithiasis or hydronephrosis.



There is no evidence of free air.  No free fluid.  No bulky

adenopathy.  Abdominal aorta is nonaneurysmal.



Pelvis:

The bowel is nonobstructed.  The bowel is unopacified with oral

contrast.  Pelvic contents demonstrate postsurgical change.  The

appendix is not seen.  Diverticulosis. Mild surrounding

inflammatory changes adjacent to the left hemicolon.



Visualized bones are unremarkable.



IMPRESSION:

Mild acute uncomplicated diverticulitis of the left hemicolon.

## 2020-10-23 ENCOUNTER — HOSPITAL ENCOUNTER (OUTPATIENT)
Dept: HOSPITAL 62 - END | Age: 35
Discharge: HOME | End: 2020-10-23
Attending: INTERNAL MEDICINE
Payer: COMMERCIAL

## 2020-10-23 VITALS — DIASTOLIC BLOOD PRESSURE: 68 MMHG | SYSTOLIC BLOOD PRESSURE: 106 MMHG

## 2020-10-23 DIAGNOSIS — K21.9: ICD-10-CM

## 2020-10-23 DIAGNOSIS — F17.200: ICD-10-CM

## 2020-10-23 DIAGNOSIS — K57.30: Primary | ICD-10-CM

## 2020-10-23 DIAGNOSIS — Z80.51: ICD-10-CM

## 2020-10-23 DIAGNOSIS — Z88.0: ICD-10-CM

## 2020-10-23 DIAGNOSIS — K52.9: ICD-10-CM

## 2020-10-23 DIAGNOSIS — Z80.1: ICD-10-CM

## 2020-10-23 DIAGNOSIS — Z79.899: ICD-10-CM

## 2020-10-23 DIAGNOSIS — Z87.19: ICD-10-CM

## 2020-10-23 PROCEDURE — 45380 COLONOSCOPY AND BIOPSY: CPT

## 2020-10-23 PROCEDURE — 88305 TISSUE EXAM BY PATHOLOGIST: CPT

## 2020-10-23 NOTE — OPERATIVE REPORT
Operative Report


DATE OF SURGERY: 10/23/20


Operative Report: 





The risk, benefits and alternatives of the procedure including the risks of 

bleeding, perforation requiring surgery have been explained to the patient in 

detail and informed consent has been obtained.  The patient is taken back to the

endoscopy suite and placed in a left lateral, decubital position.  Timeout was 

called.  Propofol medication is administered.  Rectal examination is done which 

did not reveal any masses tears or fissures.  An Olympus videoscope was 

introduced into the patient's rectum.  The colonoscope then carefully advanced 

all the way to the cecum.  Cecum was identified by the usual anatomical 

landmarks including the ileocecal valve as well as the appendiceal office.  Prep

is good.  Scope was then sequentially pulled back via the various segments of 

the colon including the ascending colon, hepatic fracture, transverse colon, 

splenic flexure, descending colon finally into the rectosigmoid portions of the 

colon.  Retroflexion maneuver is performed.


PREOPERATIVE DIAGNOSIS: Change in bowel habits


POSTOPERATIVE DIAGNOSIS: Diverticulosis on the left side associated with some 

inflammation


OPERATION: Colonoscopy with biopsy


SURGEON: ERNESTO DAIGLE


ANESTHESIA: LMAC


TISSUE REMOVED OR ALTERED: As noted above.


COMPLICATIONS: 





None.


ESTIMATED BLOOD LOSS: None.


INTRAOPERATIVE FINDINGS: As noted above.


PROCEDURE: 





Patient tolerated the procedure well.


No immediate postprocedure complications are noted.


Patient is discharged in good condition.


Discharge date 10/23/2020.  Discharge diet: Regular.





Discharge activity: Regular.  2 to 3-week follow-up to discuss findings.





Patient is instructed call the office or proceed to the emergency room should 

there be any further problems or questions.


Wait on the pathology.

## 2020-12-01 ENCOUNTER — HOSPITAL ENCOUNTER (EMERGENCY)
Dept: HOSPITAL 62 - ER | Age: 35
LOS: 1 days | Discharge: HOME | End: 2020-12-02
Payer: COMMERCIAL

## 2020-12-01 DIAGNOSIS — M47.812: ICD-10-CM

## 2020-12-01 DIAGNOSIS — Z79.1: ICD-10-CM

## 2020-12-01 DIAGNOSIS — F17.210: ICD-10-CM

## 2020-12-01 DIAGNOSIS — R10.9: Primary | ICD-10-CM

## 2020-12-01 DIAGNOSIS — Z87.19: ICD-10-CM

## 2020-12-01 DIAGNOSIS — Z79.899: ICD-10-CM

## 2020-12-01 DIAGNOSIS — Z88.0: ICD-10-CM

## 2020-12-01 LAB
ADD MANUAL DIFF: NO
ALBUMIN SERPL-MCNC: 4.2 G/DL (ref 3.5–5)
ALP SERPL-CCNC: 75 U/L (ref 38–126)
ANION GAP SERPL CALC-SCNC: 3 MMOL/L (ref 5–19)
APPEARANCE UR: (no result)
APTT PPP: YELLOW S
AST SERPL-CCNC: 23 U/L (ref 14–36)
BASOPHILS # BLD AUTO: 0.1 10^3/UL (ref 0–0.2)
BASOPHILS NFR BLD AUTO: 0.8 % (ref 0–2)
BILIRUB DIRECT SERPL-MCNC: 0.1 MG/DL (ref 0–0.4)
BILIRUB SERPL-MCNC: 0.3 MG/DL (ref 0.2–1.3)
BILIRUB UR QL STRIP: NEGATIVE
BUN SERPL-MCNC: 14 MG/DL (ref 7–20)
CALCIUM: 9.4 MG/DL (ref 8.4–10.2)
CHLORIDE SERPL-SCNC: 102 MMOL/L (ref 98–107)
CO2 SERPL-SCNC: 29 MMOL/L (ref 22–30)
EOSINOPHIL # BLD AUTO: 0.1 10^3/UL (ref 0–0.6)
EOSINOPHIL NFR BLD AUTO: 1.4 % (ref 0–6)
ERYTHROCYTE [DISTWIDTH] IN BLOOD BY AUTOMATED COUNT: 12.6 % (ref 11.5–14)
GLUCOSE SERPL-MCNC: 88 MG/DL (ref 75–110)
GLUCOSE UR STRIP-MCNC: NEGATIVE MG/DL
HCT VFR BLD CALC: 38.4 % (ref 36–47)
HGB BLD-MCNC: 13.8 G/DL (ref 12–15.5)
KETONES UR STRIP-MCNC: NEGATIVE MG/DL
LYMPHOCYTES # BLD AUTO: 2 10^3/UL (ref 0.5–4.7)
LYMPHOCYTES NFR BLD AUTO: 20.8 % (ref 13–45)
MCH RBC QN AUTO: 33.8 PG (ref 27–33.4)
MCHC RBC AUTO-ENTMCNC: 35.9 G/DL (ref 32–36)
MCV RBC AUTO: 94 FL (ref 80–97)
MONOCYTES # BLD AUTO: 0.5 10^3/UL (ref 0.1–1.4)
MONOCYTES NFR BLD AUTO: 5.4 % (ref 3–13)
NEUTROPHILS # BLD AUTO: 7 10^3/UL (ref 1.7–8.2)
NEUTS SEG NFR BLD AUTO: 71.6 % (ref 42–78)
PH UR STRIP: 6 [PH] (ref 5–9)
PLATELET # BLD: 310 10^3/UL (ref 150–450)
POTASSIUM SERPL-SCNC: 4.6 MMOL/L (ref 3.6–5)
PROT SERPL-MCNC: 6.8 G/DL (ref 6.3–8.2)
PROT UR STRIP-MCNC: NEGATIVE MG/DL
RBC # BLD AUTO: 4.08 10^6/UL (ref 3.72–5.28)
SP GR UR STRIP: 1.02
TOTAL CELLS COUNTED % (AUTO): 100 %
UROBILINOGEN UR-MCNC: NEGATIVE MG/DL (ref ?–2)
WBC # BLD AUTO: 9.8 10^3/UL (ref 4–10.5)

## 2020-12-01 PROCEDURE — 36415 COLL VENOUS BLD VENIPUNCTURE: CPT

## 2020-12-01 PROCEDURE — 81025 URINE PREGNANCY TEST: CPT

## 2020-12-01 PROCEDURE — 81001 URINALYSIS AUTO W/SCOPE: CPT

## 2020-12-01 PROCEDURE — 83690 ASSAY OF LIPASE: CPT

## 2020-12-01 PROCEDURE — 80053 COMPREHEN METABOLIC PANEL: CPT

## 2020-12-01 PROCEDURE — 85025 COMPLETE CBC W/AUTO DIFF WBC: CPT

## 2020-12-01 PROCEDURE — 99283 EMERGENCY DEPT VISIT LOW MDM: CPT

## 2020-12-01 NOTE — ER DOCUMENT REPORT
ED Medical Screen (RME)





- General


Chief Complaint: Flank Pain


Stated Complaint: FLANK PAIN


Time Seen by Provider: 12/01/20 23:05


Primary Care Provider: 


MARIA LUZ SAUER PA-C [Primary Care Provider] - Follow up as needed


TRAVEL OUTSIDE OF THE [place] IN LAST 30 DAYS: No





- HPI


Notes: 





12/01/20 23:09


34-year-old female to the emergency department with complaints of left-sided 

abdominal pain and flank pain that has been off and on since July but worse 

since after Thanksgiving.  Denies any nausea vomiting or diarrhea.  She states 

that she had a colonoscopy with Dr. العراقي several weeks ago.  She denies any 

fevers or chills.  She states that the patient is getting worse since she had 

the colonoscopy.  Denies any blood in her stool.  She states she has been taking

Bentyl for pain without any relief.  She also has been taking Voltaren.  Denies 

any urinary symptoms.  Denies possibility for pregnancy.  On brief medical 

screening exam there is tenderness to palpation to the left upper quadrant and 

left lower quadrant of the abdomen.  I performed a brief medical screening exam 

on the patient determined that the patient needs further evaluation and 

management by main side provider.  I have placed initial orders to help expedite

care.





- Related Data


Allergies/Adverse Reactions: 


                                        





Penicillins Allergy (Severe, Verified 12/01/20 23:05)


   Generalized edema











Past Medical History





- Past Medical History


Cardiac Medical History: 


   Denies: Hx Coronary Artery Disease, Hx Heart Attack, Hx Hypertension


Pulmonary Medical History: Reports: Hx Asthma - as a child/no problem as an 

adult


   Denies: Hx Bronchitis, Hx COPD, Hx Pneumonia


Neurological Medical History: Denies: Hx Cerebrovascular Accident, Hx Seizures


Renal/ Medical History: Denies: Hx Peritoneal Dialysis


GI Medical History: Denies: Hx Hepatitis, Hx Hiatal Hernia -  small umbilical 

hernia, Hx Ulcer


Musculoskeltal Medical History: Reports Hx Arthritis - NECK


Infectious Medical History: Denies: Hx Hepatitis


Past Surgical History: Reports: Hx Gynecologic Surgery.  Denies: Hx 

Hysterectomy, Hx Mastectomy, Hx Open Heart Surgery, Hx Pacemaker





- Immunizations


Immunizations up to date: Yes


Hx Diphtheria, Pertussis, Tetanus Vaccination: Yes





Physical Exam





- Vital signs


Vitals: 





                                        











Temp Pulse Resp BP Pulse Ox


 


 98.7 F   82   20   112/65   98 


 


 12/01/20 22:02  12/01/20 22:02  12/01/20 22:02  12/01/20 22:02  12/01/20 22:02














Course





- Vital Signs


Vital signs: 





                                        











Temp Pulse Resp BP Pulse Ox


 


 98.7 F   82   20   112/65   98 


 


 12/01/20 22:02  12/01/20 22:02  12/01/20 22:02  12/01/20 22:02  12/01/20 22:02














Doctor's Discharge





- Discharge


Referrals: 


MARIA LUZ SAUER PA-C [Primary Care Provider] - Follow up as needed

## 2020-12-02 VITALS — SYSTOLIC BLOOD PRESSURE: 120 MMHG | DIASTOLIC BLOOD PRESSURE: 85 MMHG

## 2020-12-02 NOTE — ER DOCUMENT REPORT
ED General





- General


Chief Complaint: Flank Pain


Stated Complaint: FLANK PAIN


Time Seen by Provider: 12/01/20 23:05


Primary Care Provider: 


MARIA LUZ SAUER PA-C [Primary Care Provider] - Follow up as needed


Notes: 





Patient presents to the ER for evaluation of left sided abdominal pain 

intermittently times several weeks.  The patient states she had a colonoscopy in

October which showed diverticulosis.  She has been given Cipro and Flagyl for 

her symptoms in the past without relief.  She denies fever.  She denies vomi

ting.  She denies diarrhea.  She did have a normal bowel movement today.  She 

denies dysuria.  She denies low back pain.





Nursing notes reviewed and past medical, social, and family histories reviewed 

and validated.


TRAVEL OUTSIDE OF THE [place] IN LAST 30 DAYS: No





- Related Data


Allergies/Adverse Reactions: 


                                        





Penicillins Allergy (Severe, Verified 12/01/20 23:05)


   Generalized edema








Home Medications: BACLOFEN.  DICLOFENA





Past Medical History





- General


Information source: Patient





- Social History


Smoking Status: Current Every Day Smoker


Cigarette use (# per day): Yes - Half a pack


Chew tobacco use (# tins/day): No


Smoking Education Provided: Yes


Frequency of alcohol use: Social


Drug Abuse: None


Lives with: Family


Family History: Reviewed & Not Pertinent


Patient has suicidal ideation: No


Patient has homicidal ideation: No





- Past Medical History


Cardiac Medical History: 


   Denies: Hx Coronary Artery Disease, Hx Heart Attack, Hx Hypertension


Pulmonary Medical History: Reports: Hx Asthma - as a child/no problem as an 

adult


   Denies: Hx Bronchitis, Hx COPD, Hx Pneumonia


EENT Medical History: Reports: None


Neurological Medical History: Denies: Hx Cerebrovascular Accident, Hx Seizures


Endocrine Medical History: Reports: None


Renal/ Medical History: Denies: Hx Peritoneal Dialysis


Malignancy Medical History: Reports: None


GI Medical History: Reports: Hx Diverticulitis, Hx Colonoscopy.  Denies: Hx 

Hepatitis, Hx Hiatal Hernia -  small umbilical hernia, Hx Ulcer


Musculoskeletal Medical History: Reports Hx Arthritis - NECK


Skin Medical History: Reports None


Psychiatric Medical History: Reports: None


Traumatic Medical History: Reports: None


Infectious Medical History: Denies: Hx Hepatitis


Past Surgical History: Reports: Hx Gynecologic Surgery.  Denies: Hx 

Hysterectomy, Hx Mastectomy, Hx Open Heart Surgery, Hx Pacemaker





- Immunizations


Immunizations up to date: Yes


Hx Diphtheria, Pertussis, Tetanus Vaccination: Yes





Review of Systems





- Review of Systems


Notes: 





Constitutional: Negative for fever.


HENT: Negative for sore throat.


Eyes: Negative for visual changes.


Cardiovascular: Negative for chest pain.


Respiratory: Negative for shortness of breath.


Gastrointestinal: Positive for abdominal pain.  Negative for vomiting or 

diarrhea.


Genitourinary: Negative for dysuria.


Musculoskeletal: Negative for back pain.


Skin: Negative for rash.


Neurological: Negative for headaches, weakness or numbness.





10 point ROS negative except as marked above and in HPI.





Physical Exam





- Vital signs


Vitals: 





                                        











Temp Pulse Resp BP Pulse Ox


 


 98.7 F   82   20   112/65   98 


 


 12/01/20 22:02  12/01/20 22:02  12/01/20 22:02  12/01/20 22:02  12/01/20 22:02














- Notes


Notes: 








CONSTITUTIONAL: Well appearing. No acute distress.


SKIN: Warm, dry, and intact without rash


EYES: Extraocular movements are grossly intact, clear conjunctiva


HENT: Normocephalic, atraumatic, moist mucus membranes


NECK: No obvious swelling, normal range of motion


PULMONARY: Normal chest rise and fall.  Breath sounds clear and equal 

bilaterally. No respiratory distress or stridor


CARDIOVASCULAR: Regular rate.  No murmurs, rubs, gallops. Distal extremities are

warm and well perfused.


ABDOMINAL: The abdomen is soft.  There is mild palpation in the left upper and 

lower quadrants.  No guarding noted.


NEUROLOGIC: Normal speech, moves all extremities.


MUSCULOSKELETAL: No gross deformities, atraumatic


PSYCHIATRIC: Normal mood and affect








Course





- Re-evaluation


Re-evalutation: 





12/02/20 01:04





Rechecked patient who has responded well to treatment in the ER.  Discussed with

patient:  results, diagnosis, treatment plan, and need for follow-up.  Return to

the emergency department warnings were given.  All questions and concerns were 

addressed.  The plan is agreed with and understood.  Patient is stable and ready

for discharge.





- Vital Signs


Vital signs: 





                                        











Temp Pulse Resp BP Pulse Ox


 


 98.7 F   82   20   112/65   98 


 


 12/01/20 22:02  12/01/20 22:02  12/01/20 22:02  12/01/20 22:02  12/01/20 22:02














- Laboratory


Result Diagrams: 


                                 12/01/20 23:20





                                 12/01/20 23:20


Laboratory results interpreted by me: 





                                        











  12/01/20 12/01/20





  23:20 23:20


 


MCH  33.8 H 


 


Sodium   134.3 L


 


Anion Gap   3 L














Discharge





- Discharge


Clinical Impression: 


 Left sided abdominal pain





Condition: Stable


Disposition: HOME, SELF-CARE


Instructions:  Abdominal Pain ([facility])


Additional Instructions: 


Make sure to follow back up with your GI doctor as discussed.  Return to the 

emergency room if your symptoms change or worsen.


Referrals: 


MARIA LUZ SAUER PA-C [Primary Care Provider] - Follow up as needed